# Patient Record
Sex: FEMALE | Race: OTHER | HISPANIC OR LATINO | URBAN - METROPOLITAN AREA
[De-identification: names, ages, dates, MRNs, and addresses within clinical notes are randomized per-mention and may not be internally consistent; named-entity substitution may affect disease eponyms.]

---

## 2018-11-15 ENCOUNTER — EMERGENCY (EMERGENCY)
Facility: HOSPITAL | Age: 33
LOS: 1 days | Discharge: HOME | End: 2018-11-15
Attending: EMERGENCY MEDICINE
Payer: SELF-PAY

## 2018-11-15 VITALS
WEIGHT: 160.94 LBS | SYSTOLIC BLOOD PRESSURE: 123 MMHG | OXYGEN SATURATION: 98 % | RESPIRATION RATE: 18 BRPM | TEMPERATURE: 98 F | HEART RATE: 99 BPM | DIASTOLIC BLOOD PRESSURE: 85 MMHG | HEIGHT: 61 IN

## 2018-11-15 LAB
BASOPHILS # BLD AUTO: 0 K/UL — SIGNIFICANT CHANGE UP (ref 0–0.2)
BASOPHILS NFR BLD AUTO: 0.5 % — SIGNIFICANT CHANGE UP (ref 0–2)
EOSINOPHIL # BLD AUTO: 0.1 K/UL — SIGNIFICANT CHANGE UP (ref 0–0.5)
EOSINOPHIL NFR BLD AUTO: 1.7 % — SIGNIFICANT CHANGE UP (ref 0–6)
HCT VFR BLD CALC: 34.5 % — SIGNIFICANT CHANGE UP (ref 34.5–45)
HGB BLD-MCNC: 11.6 G/DL — SIGNIFICANT CHANGE UP (ref 11.5–15.5)
LYMPHOCYTES # BLD AUTO: 2.3 K/UL — SIGNIFICANT CHANGE UP (ref 1–3.3)
LYMPHOCYTES # BLD AUTO: 26.4 % — SIGNIFICANT CHANGE UP (ref 13–44)
MCHC RBC-ENTMCNC: 30.5 PG — SIGNIFICANT CHANGE UP (ref 27–34)
MCHC RBC-ENTMCNC: 33.7 GM/DL — SIGNIFICANT CHANGE UP (ref 32–36)
MCV RBC AUTO: 90.7 FL — SIGNIFICANT CHANGE UP (ref 80–100)
MONOCYTES # BLD AUTO: 0.5 K/UL — SIGNIFICANT CHANGE UP (ref 0–0.9)
MONOCYTES NFR BLD AUTO: 5.8 % — SIGNIFICANT CHANGE UP (ref 2–14)
NEUTROPHILS # BLD AUTO: 5.7 K/UL — SIGNIFICANT CHANGE UP (ref 1.8–7.4)
NEUTROPHILS NFR BLD AUTO: 65.5 % — SIGNIFICANT CHANGE UP (ref 43–77)
PLATELET # BLD AUTO: 236 K/UL — SIGNIFICANT CHANGE UP (ref 150–400)
RBC # BLD: 3.81 M/UL — SIGNIFICANT CHANGE UP (ref 3.8–5.2)
RBC # FLD: 12.2 % — SIGNIFICANT CHANGE UP (ref 10.3–14.5)
WBC # BLD: 8.6 K/UL — SIGNIFICANT CHANGE UP (ref 3.8–10.5)
WBC # FLD AUTO: 8.6 K/UL — SIGNIFICANT CHANGE UP (ref 3.8–10.5)

## 2018-11-15 PROCEDURE — 99053 MED SERV 10PM-8AM 24 HR FAC: CPT

## 2018-11-15 PROCEDURE — 85027 COMPLETE CBC AUTOMATED: CPT

## 2018-11-15 PROCEDURE — 99284 EMERGENCY DEPT VISIT MOD MDM: CPT | Mod: 25

## 2018-11-15 PROCEDURE — 99283 EMERGENCY DEPT VISIT LOW MDM: CPT

## 2018-11-15 NOTE — ED ADULT NURSE NOTE - OBJECTIVE STATEMENT
Patient c/o known hemorrhoid hx, to ED c/o rectal bleeding for two weeks. Patient states that there is some pain while having BMs. Pt also states there is blood while having a BM. Patient shows pictures of her bm on her phone. Patient is alert and oriented X4, calm/cooperative, NAD, VSS. Patient also c/o dizzy. Patient has family member at bedside. Pt denies chest pain, SOB.

## 2018-11-15 NOTE — ED ADULT TRIAGE NOTE - PAIN: PRESENCE, MLM
Script(s) were written out based on previous script.  Sent to  To be signed.    Medication(s) Requested: lyrica  Last office visit: 9/11  Next Visit: 10/26  Last refill: 6/12 #90- 2 refills  Contract present: no  Urine drug screen: 3/27/18  Is the patient due for refill of this medication(s): Yes  PDMP review: Criteria met. Forwarded to Physician/EMMANUEL for signature.         complains of pain/discomfort

## 2018-11-15 NOTE — ED PROVIDER NOTE - OBJECTIVE STATEMENT
32yo F with hx of constipation and hemorrhoids presents with rectal bleeding for 2 weeks. drops of blood everytime she goes to defecate. defecating with pain. Taking natural medicines for constipation. +dizziness today, not sure if because she is nervous. Pictures of bleeding on toilet seen, few drops only.

## 2018-11-15 NOTE — ED PROVIDER NOTE - MEDICAL DECISION MAKING DETAILS
rectal bleeding only when defecating. will check CBC for dizziness and subacute bleeding. if stable, will dc wth PMD f/u with better bowel regimen. ddx include hemorrhoids vs anal fissure vs LGIB.

## 2018-11-15 NOTE — ED PROVIDER NOTE - PHYSICAL EXAMINATION
Gen: Well appearing, NAD  Head: NCAT  HEENT: PERRL, MMM, normal conjunctiva, anicteric, neck supple  Lung: CTAB, no adventitious sounds  CV: RRR, no murmurs, rubs or gallops  Abd: soft, NTND, no rebound or guarding, no CVAT  MSK: No edema, no visible deformities  Neuro: No focal neurologic deficits. CN II-XII grossly intact. 5/5 strength and normal sensation in all extremities.  Skin: Warm and dry, no evidence of rash  Psych: normal mood and affect    +external hemorrhoids without active bleeding on exam.

## 2018-11-15 NOTE — ED ADULT TRIAGE NOTE - CHIEF COMPLAINT QUOTE
Patient reports rectal bleeding x2 weeks.  Pt has hx hemorrhoids.  Pt states every time she goes to the bathroom it is extremely painful and she always has "a lot of blood".  Patient states when she tries to wipe she "feels something", but does not know what it is.  Pt also reports dizziness and HA.

## 2018-11-15 NOTE — ED PROVIDER NOTE - ATTENDING CONTRIBUTION TO CARE
33F presenting with rectal bleeding for 2 weeks, pain with defecation; h/o hemorrhoids.    On Physical Exam:  General: well appearing, in NAD, speaking clearly in full sentences and without difficulty; cooperative with exam  HEENT: NCAT, MMM  Abdomen: soft nontender/nondistended  Rectal: performed with resident present, two engorged hemorrhoids noted, no active bleeding.  : no bladder tenderness or distension  Skin: intact, no rash  Extremities: no peripheral edema, no gross deformities  Psych: nrl mood/affect    AP: Painful rectal bleeding a/w defecation in setting of visualized hemorrhoids, in setting of otherwise healthy 32y/o female.  Will check CBC to eval for anemia given duration (2 weeks) though this is unlikely.  Hemorrhoids not thrombosed.  Have counseled patient on stool softeners, importance of not straining for bowel movements and to f/u with surgery clinic for her hemorrhoids.  Have also recommended sitz baths.  Pain only with defecation, not having pain to sit, no need for topical anesthetics at present.  Patient verbalizes understanding of ED evaluation and discharge plan including medications, follow-up instructions (miralax) and return precautions (increased bleeding, symptoms of anemia).